# Patient Record
Sex: MALE | URBAN - METROPOLITAN AREA
[De-identification: names, ages, dates, MRNs, and addresses within clinical notes are randomized per-mention and may not be internally consistent; named-entity substitution may affect disease eponyms.]

---

## 2019-05-03 ENCOUNTER — OFFICE VISIT (OUTPATIENT)
Dept: INTERNAL MEDICINE CLINIC | Age: 59
End: 2019-05-03

## 2019-05-03 VITALS
HEART RATE: 71 BPM | BODY MASS INDEX: 30.09 KG/M2 | RESPIRATION RATE: 18 BRPM | SYSTOLIC BLOOD PRESSURE: 131 MMHG | DIASTOLIC BLOOD PRESSURE: 85 MMHG | WEIGHT: 227 LBS | OXYGEN SATURATION: 94 % | HEIGHT: 73 IN | TEMPERATURE: 98.3 F

## 2019-05-03 DIAGNOSIS — R39.9 LOWER URINARY TRACT SYMPTOMS (LUTS): ICD-10-CM

## 2019-05-03 DIAGNOSIS — K21.9 GASTROESOPHAGEAL REFLUX DISEASE WITHOUT ESOPHAGITIS: ICD-10-CM

## 2019-05-03 DIAGNOSIS — Z00.00 ENCOUNTER FOR PREVENTIVE HEALTH EXAMINATION: Primary | ICD-10-CM

## 2019-05-03 DIAGNOSIS — R73.03 PREDIABETES: ICD-10-CM

## 2019-05-03 DIAGNOSIS — R05.9 COUGH: ICD-10-CM

## 2019-05-03 DIAGNOSIS — R79.89 ELEVATED LIVER FUNCTION TESTS: ICD-10-CM

## 2019-05-03 PROBLEM — M77.11 RIGHT LATERAL EPICONDYLITIS: Status: ACTIVE | Noted: 2019-05-03

## 2019-05-03 RX ORDER — PHENOL/SODIUM PHENOLATE
20 AEROSOL, SPRAY (ML) MUCOUS MEMBRANE 2 TIMES DAILY
Qty: 60 TAB | Refills: 5 | Status: SHIPPED | OUTPATIENT
Start: 2019-05-03

## 2019-05-03 NOTE — PROGRESS NOTES
Chief Complaint Patient presents with  Physical  
 
1. Have you been to the ER, urgent care clinic since your last visit? Hospitalized since your last visit? NO 
 
2. Have you seen or consulted any other health care providers outside of the [address] [place] [address] since your last visit? Include any pap smears or colon screening.  No

## 2019-05-03 NOTE — PATIENT INSTRUCTIONS
Elevated liver function>>liver ultrasound (negative for Hep C) Hemochromatosis carrier>>YOU DO NOT HAVE THE DISEASE  2014 Lower Urinary Tract Symptoms>>check bladder ultrasound to assess ability to empty bladder>>consider Finasteride which will help with hair loss GERD>>Omeprazole daily and avoid triggers (spicy and late night meals, alcohol, coffee) Screening:   PSA ordered, confirm date of colonoscopy

## 2019-05-03 NOTE — PROGRESS NOTES
HPI:  
 
This patient comes in to establish medical care without any old records for review. [address] [place] [address] was queried and information populated into EMR. His medications were reconciled. He has several concerns which were written in a post it note apparently authored by his wife: 1. Abnormal liver function studies and whether he has been tested for hemochromatosis and indeed genetic testing done revealed 
    he is a carrier but does NOT have the condition 2. Testosterone level in light of reduced libido not accompanied by ED or loss of muscle bulk. He denies any exposure to essential 
     oils (endocrine disruptors). This has not been previously assessed. 3. Testing for A1C although he does not have a record of elevated glucose on file and may not be covered by his insurance 4. Recent development of reduced urinary stream in absence of use of anticholinergic agent and prior normal PSA. He also reports that his reflux symptoms have flared and has had episodes where he could not swallow. He has been taking Prilosec OTC on a prn basis. He had EGD performed by Dr. Katie Fletcher prior to his half-way and had esophageal dilatation done at that time. There are no consultation notes on file. He has not made arrangements for follow up. He has history of hyperlipidemia but I do not have the records to determine if he met threshold for treatment. He is not on a restricted diet. 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 This has not been associated with tingling or weakness of the right arm. Past Medical History:  
Diagnosis Date  Elevated liver function tests Hep C negative  GERD (gastroesophageal reflux disease)  Hemochromatosis carrier  Hyperlipidemia  Lower urinary tract symptoms (LUTS) Past Surgical History:  
Procedure Laterality Date  HX CHOLECYSTECTOMY  HX HAMMER TOE REPAIR    
 x3  
 HX HERNIA REPAIR    
 
 No current outpatient medications on file. No current facility-administered medications for this visit. Allergies and Intolerances:  
No Known Allergies Family History:  
Family History Problem Relation Age of Onset  No Known Problems Mother  Heart Disease Father Social History: He  reports that he has never smoked. He has never used smokeless tobacco.  
Social History Substance and Sexual Activity Alcohol Use Yes  Alcohol/week: 2.4 oz  Types: 4 Shots of liquor per week Comment: alot Immunization History:      Avoid Diet:                                 Sensible Exercise:                         Lifting weights, Walking Occupational Risk:         No hazards (massage therapist) Review of Systems Constitutional: Negative for chills and fever. HENT: Negative for hearing loss. Eyes: Negative for blurred vision. Respiratory: Negative for cough and shortness of breath. Cardiovascular: Negative for chest pain and palpitations. Gastrointestinal: Positive for heartburn. Genitourinary: Positive for frequency. Musculoskeletal: Positive for joint pain. Neurological: Negative for dizziness and headaches. Endo/Heme/Allergies: Positive for environmental allergies. Psychiatric/Behavioral: Positive for depression. The patient has insomnia. Physical:  
Visit Vitals /85 (BP 1 Location: Left arm, BP Patient Position: Sitting) Pulse 71 Temp 98.3 °F (36.8 °C) (Oral) Resp 18 Ht 6' 1\" (1.854 m) Wt 227 lb (103 kg) SpO2 94% BMI 29.95 kg/m² Physical Exam  
Constitutional: He is well-developed, well-nourished, and in no distress. HENT:  
Right Ear: External ear normal.  
Left Ear: External ear normal.  
Mouth/Throat: Oropharynx is clear and moist.  
Eyes: Conjunctivae are normal. No scleral icterus. Neck: Neck supple. No JVD present.   
Cardiovascular: Normal rate, regular rhythm, normal heart sounds and intact distal pulses. Exam reveals no gallop. No murmur heard. Pulmonary/Chest: Breath sounds normal. He has no wheezes. He has no rales. Abdominal: Soft. Bowel sounds are normal. He exhibits no mass. There is no tenderness. Genitourinary:  
Genitourinary Comments: Smooth firm testes, no hernias, smooth non-enlarged prostate Musculoskeletal: He exhibits no edema or tenderness. No CTL spine tenderness, tight hamstrings and hip flexors, no warmth or STS right elbow Lymphadenopathy:  
  He has no cervical adenopathy. Skin: Skin is warm. Vitals reviewed. Review of Data: 
Labs: 
No results found for any previous visit. Impression/Plan: 
 Patient Active Problem List  
Diagnosis  Code Screening/Wellness Shingrix recommended along with annual flu vaccine, confirm date of colonoscopy Z0.00 LUTS PSA ordered along with bladder ultrasound to assess PVR 
 R39.9 Elevated LFT likely from NAFLD Confirm immunity to Hepatitis A/B, arrange liver ultrasound, weight loss, limit ETOH use R94.5 GERD with dysphagia Omeprazole 20mg bid and arrange EGD 
 K21.9  Hemochromatosis carrier Reminded him that he does NOT have the disease Z14.8  Hyperlipidemia FLP ordered and ACC/AHA risk score to be calculated E78.5  Decline in libido likely physiologic (PAULINA) Check fasting T and TSH R94.5  Right elbow pain from tendinitis MORGAN, topical analgesic, brace M77.11 Anabelle Yeboah MD

## 2019-05-13 DIAGNOSIS — Z14.8 HEMOCHROMATOSIS CARRIER: Primary | ICD-10-CM

## 2019-05-14 LAB
% FREE TESTOSTERONE: 2.09 % (ref 1.5–4.2)
25(OH)D3 SERPL-MCNC: 24.6 NG/ML (ref 32–100)
A-G RATIO,AGRAT: 2.2 RATIO (ref 1.1–2.6)
ALBUMIN SERPL-MCNC: 5.1 G/DL (ref 3.5–5)
ALP SERPL-CCNC: 92 U/L (ref 25–115)
ALT SERPL-CCNC: 55 U/L (ref 5–40)
ANION GAP SERPL CALC-SCNC: 20 MMOL/L
AST SERPL W P-5'-P-CCNC: 43 U/L (ref 10–37)
AVG GLU, 10930: 100 MG/DL (ref 91–123)
BILIRUB SERPL-MCNC: 0.7 MG/DL (ref 0.2–1.2)
BILIRUB UR QL: NEGATIVE
BUN SERPL-MCNC: 13 MG/DL (ref 6–22)
CALCIUM SERPL-MCNC: 9.4 MG/DL (ref 8.4–10.4)
CHLORIDE SERPL-SCNC: 103 MMOL/L (ref 98–110)
CHOLEST SERPL-MCNC: 184 MG/DL (ref 110–200)
CO2 SERPL-SCNC: 21 MMOL/L (ref 20–32)
CREAT SERPL-MCNC: 0.9 MG/DL (ref 0.5–1.2)
ERYTHROCYTE [DISTWIDTH] IN BLOOD BY AUTOMATED COUNT: 12.2 % (ref 10–15.5)
FE % SATURATION,PSAT: 57 % (ref 20–50)
FERRITIN SERPL-MCNC: 984 NG/ML (ref 22–322)
FREE TESTOSTERONE,DIRECT, 144981: 7.23 NG/DL (ref 5–21)
GFRAA, 66117: >60
GFRNA, 66118: >60
GLOBULIN,GLOB: 2.3 G/DL (ref 2–4)
GLUCOSE SERPL-MCNC: 106 MG/DL (ref 70–99)
GLUCOSE UR QL: NEGATIVE MG/DL
HBA1C MFR BLD HPLC: 5.1 % (ref 4.8–5.9)
HCT VFR BLD AUTO: 48.5 % (ref 39.3–51.6)
HDLC SERPL-MCNC: 3.4 MG/DL (ref 0–5)
HDLC SERPL-MCNC: 54 MG/DL (ref 40–59)
HGB BLD-MCNC: 16.7 G/DL (ref 13.1–17.2)
HGB UR QL STRIP: NEGATIVE
IRON,IRN: 164 MCG/DL (ref 45–160)
KETONES UR QL STRIP.AUTO: NEGATIVE MG/DL
LDLC SERPL CALC-MCNC: 105 MG/DL (ref 50–99)
LEUKOCYTE ESTERASE: NEGATIVE
MCH RBC QN AUTO: 33 PG (ref 26–34)
MCHC RBC AUTO-ENTMCNC: 34 G/DL (ref 31–36)
MCV RBC AUTO: 94 FL (ref 80–95)
NITRITE UR QL STRIP.AUTO: NEGATIVE
PH UR STRIP: 5 PH (ref 5–8)
PLATELET # BLD AUTO: 237 K/UL (ref 140–440)
PMV BLD AUTO: 10.5 FL (ref 9–13)
POTASSIUM SERPL-SCNC: 4.2 MMOL/L (ref 3.5–5.5)
PROT SERPL-MCNC: 7.4 G/DL (ref 6.4–8.3)
PROT UR QL STRIP: NEGATIVE MG/DL
RBC # BLD AUTO: 5.14 M/UL (ref 3.8–5.8)
RBC #/AREA URNS HPF: NEGATIVE /HPF
SODIUM SERPL-SCNC: 144 MMOL/L (ref 133–145)
SP GR UR: 1.02 (ref 1–1.03)
TESTOSTERONE: 346 NG/DL (ref 264–916)
TIBC,TIBC: 290 MCG/DL (ref 228–428)
TRIGL SERPL-MCNC: 123 MG/DL (ref 40–149)
TSH SERPL DL<=0.005 MIU/L-ACNC: 3.03 MCU/ML (ref 0.27–4.2)
UIBC SERPL-MCNC: 126 MCG/DL (ref 110–370)
UROBILINOGEN UR STRIP-MCNC: <2 MG/DL
VLDLC SERPL CALC-MCNC: 25 MG/DL (ref 8–30)
WBC # BLD AUTO: 4.7 K/UL (ref 4–11)
WBC URNS QL MICRO: NEGATIVE /HPF (ref 0–2)

## 2019-05-15 ENCOUNTER — TELEPHONE (OUTPATIENT)
Dept: INTERNAL MEDICINE CLINIC | Age: 59
End: 2019-05-15

## 2019-05-15 NOTE — TELEPHONE ENCOUNTER
VM left regardin. Need to call Dr. Mere Laird office for follow up appt as they cannot reach him. 2. LFTs elevated and liver ultrasound requested    3. Ferritin level is very high and referral to Dr. Victorina Bolivar for opinion. He is a carrier for hemochromatosis    4.  He is prediabetic    Letter mailed earlier this week summarizing results in full and advised to provide alternative contact number